# Patient Record
Sex: FEMALE | Race: WHITE | Employment: UNEMPLOYED | URBAN - METROPOLITAN AREA
[De-identification: names, ages, dates, MRNs, and addresses within clinical notes are randomized per-mention and may not be internally consistent; named-entity substitution may affect disease eponyms.]

---

## 2017-02-03 PROBLEM — K56.609 SMALL BOWEL OBSTRUCTION (HCC): Status: ACTIVE | Noted: 2017-02-03

## 2018-07-23 ENCOUNTER — OFFICE VISIT (OUTPATIENT)
Dept: CARDIOLOGY CLINIC | Age: 83
End: 2018-07-23
Payer: MEDICARE

## 2018-07-23 VITALS
HEIGHT: 61 IN | BODY MASS INDEX: 24.11 KG/M2 | DIASTOLIC BLOOD PRESSURE: 68 MMHG | WEIGHT: 127.7 LBS | HEART RATE: 69 BPM | SYSTOLIC BLOOD PRESSURE: 108 MMHG | RESPIRATION RATE: 18 BRPM

## 2018-07-23 DIAGNOSIS — I10 ESSENTIAL HYPERTENSION, BENIGN: Primary | Chronic | ICD-10-CM

## 2018-07-23 PROCEDURE — 99213 OFFICE O/P EST LOW 20 MIN: CPT | Performed by: INTERNAL MEDICINE

## 2018-07-23 PROCEDURE — 93000 ELECTROCARDIOGRAM COMPLETE: CPT | Performed by: INTERNAL MEDICINE

## 2018-07-23 NOTE — PROGRESS NOTES
[facility]  Wing NEO Galvez. Nirali Ramos. Rona Baker M.D. Trino Watts M.D.  Ev Kay. Sandie Hairston M.D. Halle Crump M.D. Oc Catherine, Ozzy Horn M.D.                Ruma Flaviolion   4/25/1929  Miguel Hinkle MD      This 70-year-old woman had outpatient cardiac assessment today. She is accompanied by her . She has a history of chronic renal insufficiency and anemia. Historically, she is followed with the hematologist. She has chronic ischemic heart disease. She is sedentary, but continues to live independently in her own home with her . She states that she'll become dyspneic with some activities, but ordinarily can shop and go without work without any chest pain. She denies orthopnea or PND    Medical History:  1. Anemia, treated with Ferrlecit and Aranesp. 2. Chronic kidney disease, Stage III.  BUN/Cr 33/2.2, GFR 21 - 05/2015.    3. Hypertension. 4. GI bleed. 5. Hypothyroidism. 6. Thyroid abnormality. 7. Arthritis. 8. GERD. 9. Atrial flutter. 10. Hyperlipidemia. 11. Hysterectomy, hernia repair, appendectomy, eye surgery (cataracts). 12. Colon cancer.  T2, N0.  Status post right hemicolectomy - 2015.    13. Allergies:  Iodine and sulfa.    14. Chronic/active cigarette abuse, one-half pack daily - 10/2015.    15. Myocardial infarction, 08/2015 - [facility] admission.  Troponin elevated. 16. Lexiscan MPS. EF 65%. Wall motion normal. Fixed defect lateral wall. \"Limited region of possible shawna-infarct ischemia. \"    17. Echo 08/24/2015. No chamber dilatation.  No hemodynamically significant valvular abnormality. RVSP 35.8. EF 72%.    18. Hernia repair, [facility] - 05/2016.      Review of Systems:  Constitutional: negative for fever and chills  Respiratory: negative for cough and hemoptysis  Cardiovascular:   Gastrointestinal: negative for abdominal pain, diarrhea, nausea and vomiting  Genitourinary:negative for dysuria and hematuria  Derm: negative for rash and skin lesion(s)  Neurological: negative for seizures and tremors  Endocrine: negative for diabetic symptoms including polydipsia and polyuria  Musculoskeletal: negative for CTD  Psychiatric: negative for psychosis and major depression    On examination, she is alert, pleasant, appropriate, elderly woman in no distress. Skin is warm and dry. Respirations are unlabored. /68   Pulse 69   Resp 18   Ht 5' 1\" (1.549 m)   Wt 127 lb 11.2 oz (57.9 kg)   BMI 24.13 kg/m² . HEENT negative for scleral icterus. Extraocular muscles intact. No facial asymmetry or central cyanosis. Neck without masses or goiter. No bruit or JVD. Cardiac apex not displaced. Rhythm regular. Abdomen normal.  Extremities without edema. Lungs are clear. EKG today shows sinus rhythm at 69/m. RBBB. Low voltage. Clinically Mrs. Karina Flores is stable from a cardiac perspective and no changes are made in her medications. She will be scheduled for follow-up. She is asked to continue to see Dr. Quyen Theodore on a regular basis. At completion of today's visit, medications include the following:    Current Outpatient Prescriptions:     metoprolol tartrate (LOPRESSOR) 25 MG tablet, Take 0.5 tablets by mouth 2 times daily, Disp: 60 tablet, Rfl: 3    Cholecalciferol (VITAMIN D) 2000 UNITS CAPS capsule, Take 2,000 Units by mouth nightly , Disp: , Rfl:     aspirin 81 MG tablet, Take 81 mg by mouth daily, Disp: , Rfl:     calcitRIOL (ROCALTROL) 0.25 MCG capsule, Take 0.25 mcg by mouth daily , Disp: , Rfl:     loperamide (IMODIUM) 2 MG capsule, Take 2 mg by mouth as needed , Disp: , Rfl:     famotidine (PEPCID) 20 MG tablet, Take 20 mg by mouth daily, Disp: , Rfl:     ferrous sulfate 325 (65 FE) MG tablet, Take 325 mg by mouth nightly , Disp: , Rfl:     atorvastatin (LIPITOR) 40 MG tablet, Take 40 mg by mouth nightly , Disp: , Rfl:     levothyroxine (SYNTHROID) 100 MCG tablet, Take 100 mcg by mouth daily.  Instructed to take with sip water am of procedure , Disp: , Rfl:     traMADol (ULTRAM) 50 MG tablet, Take 50 mg by mouth nightly.  , Disp: , Rfl:       Bakari Schmitt MD

## 2018-07-23 NOTE — PROGRESS NOTES
[facility]  Leobardo Laurent M.D. Nolan Jimenes. Keisha Allen M.D. Marimar Phan M.D.  Halle Oliveira. Lynn Saenz M.D. Eliu Franks M.D. Denia Nunez M.D.                Tika Shall   4/25/1929  Faraz Brooks MD      ***    Medical History:  edical History:  1. Anemia, treated with Ferrlecit and Aranesp. 2. Chronic kidney disease, Stage III.  BUN/Cr 33/2.2, GFR 21 - 05/2015. 3. Hypertension. 4. GI bleed. 5. Hypothyroidism. 6. Thyroid abnormality. 7. Arthritis. 8. GERD. 9. Atrial flutter. 10. Hyperlipidemia. 11. Hysterectomy, hernia repair, appendectomy, eye surgery (cataracts). 12. Colon cancer. T2, N0. Status post right hemicolectomy - 2015. 13. Allergies:  Iodine and sulfa. 14. Chronic/active cigarette abuse, one-half pack daily - 10/2015. 15. Myocardial infarction, 08/2015 - [facility] admission. Troponin elevated. 16. Lexiscan MPS. EF 65%. Wall motion normal. Fixed defect lateral wall. \"Limited region of possible shawna-infarct ischemia. \"    17. Echo 08/24/2015. No chamber dilatation. No hemodynamically significant valvular abnormality. RVSP 35.8. EF 72%. 18. Hernia repair, [facility] - 05/2016. Review of Systems:  Constitutional: negative for fever and chills  Respiratory: negative for cough and hemoptysis  Cardiovascular:   Gastrointestinal: negative for abdominal pain, diarrhea, nausea and vomiting  Genitourinary:negative for dysuria and hematuria  Derm: negative for rash and skin lesion(s)  Neurological: negative for seizures and tremors  Endocrine: negative for diabetic symptoms including polydipsia and polyuria  Musculoskeletal: negative for CTD  Psychiatric: negative for psychosis and major depression    On examination, ***. Skin is warm and dry. Respirations are unlabored. /68   Pulse 69   Resp 18   Ht 5' 1\" (1.549 m)   Wt 127 lb 11.2 oz (57.9 kg)   BMI 24.13 kg/m² . HEENT negative for scleral icterus.   Extraocular

## 2019-05-29 NOTE — PROGRESS NOTES
CTD  Psychiatric: negative for psychosis and major depression    On examination, she is an alert, pleasant, elderly female. Skin is warm and dry. Respirations are unlabored. /68   Pulse 63   Resp 12   Ht 5' 1\" (1.549 m)   Wt 131 lb (59.4 kg)   BMI 24.75 kg/m² . HEENT negative for scleral icterus. Extraocular muscles intact. No facial asymmetry or central cyanosis. Neck without masses or goiter. No bruit or JVD. Cardiac apex not displaced. Rhythm regular. Abdomen normal.  Extremities without edema. EKG today shows normal sinus rhythm and probable old inferior MI. Today it is noted that her blood pressure is somewhat low. She does not seemingly have any adverse effect from it. However, if this is the range in follow-up with Dr. Leeann Olsen, then decreasing the metoprolol dose would be appropriate. Today, no cardiac testing is recommended. She'll be seen back in follow-up in about one year. At completion of today's visit, medications include the following:    Current Outpatient Medications:     metoprolol tartrate (LOPRESSOR) 25 MG tablet, Take 0.5 tablets by mouth 2 times daily, Disp: 60 tablet, Rfl: 3    Cholecalciferol (VITAMIN D) 2000 UNITS CAPS capsule, Take 2,000 Units by mouth nightly , Disp: , Rfl:     aspirin 81 MG tablet, Take 81 mg by mouth daily, Disp: , Rfl:     calcitRIOL (ROCALTROL) 0.25 MCG capsule, Take 0.25 mcg by mouth daily , Disp: , Rfl:     loperamide (IMODIUM) 2 MG capsule, Take 2 mg by mouth as needed , Disp: , Rfl:     famotidine (PEPCID) 20 MG tablet, Take 20 mg by mouth daily, Disp: , Rfl:     ferrous sulfate 325 (65 FE) MG tablet, Take 325 mg by mouth nightly , Disp: , Rfl:     atorvastatin (LIPITOR) 40 MG tablet, Take 40 mg by mouth nightly , Disp: , Rfl:     levothyroxine (SYNTHROID) 100 MCG tablet, Take 100 mcg by mouth daily.  Instructed to take with sip water am of procedure , Disp: , Rfl:     traMADol (ULTRAM) 50 MG tablet, Take 50 mg by mouth nightly.  , Disp: , Rfl:       Sumit Tello MD

## 2019-05-31 ENCOUNTER — OFFICE VISIT (OUTPATIENT)
Dept: CARDIOLOGY CLINIC | Age: 84
End: 2019-05-31
Payer: MEDICARE

## 2019-05-31 VITALS
WEIGHT: 131 LBS | SYSTOLIC BLOOD PRESSURE: 102 MMHG | BODY MASS INDEX: 24.73 KG/M2 | HEART RATE: 63 BPM | HEIGHT: 61 IN | DIASTOLIC BLOOD PRESSURE: 68 MMHG | RESPIRATION RATE: 12 BRPM

## 2019-05-31 DIAGNOSIS — I10 HYPERTENSION, UNSPECIFIED TYPE: Primary | ICD-10-CM

## 2019-05-31 PROCEDURE — 93000 ELECTROCARDIOGRAM COMPLETE: CPT | Performed by: INTERNAL MEDICINE

## 2019-05-31 PROCEDURE — 99024 POSTOP FOLLOW-UP VISIT: CPT | Performed by: INTERNAL MEDICINE

## 2021-04-22 ENCOUNTER — OFFICE VISIT (OUTPATIENT)
Dept: CARDIOLOGY CLINIC | Age: 86
End: 2021-04-22
Payer: MEDICARE

## 2021-04-22 VITALS
HEIGHT: 62 IN | HEART RATE: 78 BPM | BODY MASS INDEX: 22.28 KG/M2 | DIASTOLIC BLOOD PRESSURE: 70 MMHG | RESPIRATION RATE: 18 BRPM | SYSTOLIC BLOOD PRESSURE: 110 MMHG | WEIGHT: 121.1 LBS

## 2021-04-22 DIAGNOSIS — I10 ESSENTIAL HYPERTENSION, BENIGN: Primary | Chronic | ICD-10-CM

## 2021-04-22 PROCEDURE — 93000 ELECTROCARDIOGRAM COMPLETE: CPT | Performed by: INTERNAL MEDICINE

## 2021-04-22 PROCEDURE — 99212 OFFICE O/P EST SF 10 MIN: CPT | Performed by: INTERNAL MEDICINE

## 2021-04-22 RX ORDER — DICYCLOMINE HYDROCHLORIDE 10 MG/1
10 CAPSULE ORAL
COMMUNITY

## 2021-04-22 RX ORDER — MEMANTINE HYDROCHLORIDE 5 MG/1
5 TABLET ORAL 2 TIMES DAILY
COMMUNITY

## 2021-04-22 RX ORDER — DONEPEZIL HYDROCHLORIDE 10 MG/1
10 TABLET, FILM COATED ORAL NIGHTLY
COMMUNITY

## 2021-04-22 NOTE — PROGRESS NOTES
[facility]  Yaz Jaramillo. Mateo Collins M.D. Alvarado Parr M.D.  Shira Dias. Ronal Hinson M.D. Kandi Farr M.D. Ophelia Huitron M.D.                [facility]   4/25/1929  Edis Negrete MD      This 78-year-old woman had outpatient cardiac follow-up today. She continues to live independently with her . According to him she has developed some mild dementia. Today she states she is not having any chest pain or dyspnea but tires easily. She states that she eats and sleeps well. She has a history of anemia, chronic renal insufficiency and ischemic heart disease. She has chronic mild exertional dyspnea. Today, she states she cares for her house and does usual activities and is not severely limited. She denies chest pain. Medical History:  1. Anemia, treated with Ferrlecit and Aranesp. 2. Chronic kidney disease, Stage III.  BUN/Cr 33/2.2, GFR 21 - 05/2015.    3. Hypertension. 4. GI bleed. 5. Hypothyroidism. 6. Thyroid abnormality. 7. Arthritis. 8. GERD. 9. Atrial flutter. 10. Hyperlipidemia. 11. Hysterectomy, hernia repair, appendectomy, eye surgery (cataracts). 12. Colon cancer.  T2, N0.  Status post right hemicolectomy - 2015.    13. Allergies:  Iodine and sulfa.    14. Chronic/active cigarette abuse, one-half pack daily - 10/2015.    15. Myocardial infarction, 08/2015 - [facility] admission.  Troponin elevated. 16. Lexiscan MPS. EF 65%. Wall motion normal. Fixed defect lateral wall. \"Limited region of possible shawna-infarct ischemia. \"    17. Echo 08/24/2015. No chamber dilatation.  No hemodynamically significant valvular abnormality. RVSP 35.8. EF 72%.    18. Hernia repair, [facility] - 05/2016.      Review of Systems:  Constitutional: negative for fever and chills  Respiratory: negative for cough and hemoptysis  Cardiovascular:   Gastrointestinal: negative for abdominal pain, diarrhea, nausea and vomiting  Genitourinary:negative for dysuria and hematuria  Derm: negative for rash and skin lesion(s)  Neurological: negative for seizures and tremors  Endocrine: negative for diabetic symptoms including polydipsia and polyuria  Musculoskeletal: negative for CTD  Psychiatric: negative for psychosis and major depression    On examination, she is an alert, pleasant, elderly female. Skin is warm and dry. Respirations are unlabored. /70   Pulse 78   Resp 18   Ht 5' 2\" (1.575 m)   Wt 121 lb 1.6 oz (54.9 kg)   BMI 22.15 kg/m² . HEENT negative for scleral icterus. Extraocular muscles intact. No facial asymmetry or central cyanosis. Neck without masses or goiter. No bruit or JVD. Cardiac apex not displaced. Rhythm regular. Abdomen normal.  Extremities without edema. EKG today per Dr. Monica Rubio review: Normal sinus rhythm 78/min. Low QRS voltage. Otherwise normal    She seems symptomatically stable from a cardiovascular status. Medications are reviewed today and no changes made. She is asked to continue close follow-up with Dr. Ashley Mittal and will have cardiac follow-up in 6 months    At completion of today's visit, medications include the following:    Current Outpatient Medications:     donepezil (ARICEPT) 10 MG tablet, Take 10 mg by mouth nightly, Disp: , Rfl:     dicyclomine (BENTYL) 10 MG capsule, Take 10 mg by mouth 4 times daily (before meals and nightly), Disp: , Rfl:     memantine (NAMENDA) 5 MG tablet, Take 5 mg by mouth 2 times daily, Disp: , Rfl:     metoprolol tartrate (LOPRESSOR) 25 MG tablet, Take 0.5 tablets by mouth 2 times daily, Disp: 60 tablet, Rfl: 3    atorvastatin (LIPITOR) 40 MG tablet, Take 40 mg by mouth nightly , Disp: , Rfl:     levothyroxine (SYNTHROID) 100 MCG tablet, Take 100 mcg by mouth daily. Instructed to take with sip water am of procedure , Disp: , Rfl:       Sumit Rubio MD

## 2022-02-07 ENCOUNTER — OFFICE VISIT (OUTPATIENT)
Dept: CARDIOLOGY CLINIC | Age: 87
End: 2022-02-07
Payer: MEDICARE

## 2022-02-07 VITALS
RESPIRATION RATE: 16 BRPM | OXYGEN SATURATION: 97 % | DIASTOLIC BLOOD PRESSURE: 60 MMHG | WEIGHT: 132.6 LBS | SYSTOLIC BLOOD PRESSURE: 118 MMHG | HEIGHT: 62 IN | BODY MASS INDEX: 24.4 KG/M2 | HEART RATE: 79 BPM

## 2022-02-07 DIAGNOSIS — E03.9 HYPOTHYROIDISM, UNSPECIFIED TYPE: Chronic | ICD-10-CM
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
## 2022-02-07 DIAGNOSIS — D50.8 OTHER IRON DEFICIENCY ANEMIA: Chronic | ICD-10-CM

## 2022-02-07 DIAGNOSIS — I10 ESSENTIAL HYPERTENSION, BENIGN: ICD-10-CM

## 2022-02-07 DIAGNOSIS — R94.39 ABNORMAL STRESS TEST: ICD-10-CM

## 2022-02-07 DIAGNOSIS — Z87.19 H/O: GI BLEED: ICD-10-CM

## 2022-02-07 DIAGNOSIS — I48.92 ATRIAL FLUTTER, UNSPECIFIED TYPE (HCC): Primary | ICD-10-CM

## 2022-02-07 PROCEDURE — 93000 ELECTROCARDIOGRAM COMPLETE: CPT | Performed by: INTERNAL MEDICINE

## 2022-02-07 PROCEDURE — 99214 OFFICE O/P EST MOD 30 MIN: CPT | Performed by: INTERNAL MEDICINE

## 2022-02-07 RX ORDER — LOPERAMIDE HYDROCHLORIDE 2 MG/1
2 CAPSULE ORAL 4 TIMES DAILY PRN
COMMUNITY

## 2022-02-07 NOTE — PROGRESS NOTES
Out Patient CARDIOLOGY FOLLOW UP    Name: Kasia Patient    Age: 80 y.o. Date of Service: 2/7/2022      Referring Physician: No admitting provider for patient encounter. Chief Complaint   Patient presents with    Hypertension     Pt has no complaints        History of Present Illness: 70-year-old female very difficult in hearing history of anemia, hypertension, prior GI bleed, hypothyroidism, atrial flutter, hyperlipidemia, history of colon cancer in 2015, history of tobacco abuse, who presented for follow-up visit. The  report patient's dementia is worsening and they will follow-up with your primary care physician. Otherwise no chest pain or dyspnea on exertion. In 2015 he had an ischemic evaluation that revealed small area of possible shawna-infarct ischemia and this was medically managed and now denies any symptoms. She reports she slipped and fell recently and following up with her primary care physician for evaluation and management. To follow-up in 6 months. Medical History:  1. Anemia, treated with Ferrlecit and Aranesp. 2. Chronic kidney disease, Stage III.  BUN/Cr 33/2.2, GFR 21 - 05/2015.    3. Hypertension. 4. GI bleed. 5. Hypothyroidism. 6. Thyroid abnormality. 7. Arthritis. 8. GERD. 9. Atrial flutter. 10. Hyperlipidemia. 11. Hysterectomy, hernia repair, appendectomy, eye surgery (cataracts). 12. Colon cancer.  T2, N0.  Status post right hemicolectomy - 2015.    13. Allergies:  Iodine and sulfa.    14. Chronic/active cigarette abuse, one-half pack daily - 10/2015.    15. Myocardial infarction, 08/2015 - [facility] admission.  Troponin elevated. 16. Lexiscan MPS. EF 65%. Wall motion normal. Fixed defect lateral wall. \"Limited region of possible shawna-infarct ischemia. \"    17. Echo 08/24/2015. No chamber dilatation.  No hemodynamically significant valvular abnormality. RVSP 35.8. EF 72%.    18. Hernia repair, [facility] - 05/2016.      Review of Systems:   Cardiac: As per HPI  General: Denies fever or chills  Pulmonary: As per HPI  HEENT: Denies runny nose  GI: No complaints  : No complaints  Endocrine: Denies night sweats  Musculoskeletal: No complaints  Skin: Dry skin  Neuro: No complaints  Psych: Denies depression    Past Medical History:  Past Medical History:   Diagnosis Date    Arthritis     back    Atrial flutter (HCC)     controled with medication    GERD (gastroesophageal reflux disease)     Hyperlipidemia     Nausea & vomiting     Thyroid disease        Past Surgical History:  Past Surgical History:   Procedure Laterality Date    APPENDECTOMY      COSMETIC SURGERY      ENDOSCOPY, COLON, DIAGNOSTIC      EYE SURGERY      bilateral cataract   [place]    at abdomen  x2    HERNIA REPAIR Right 4/2016    HYSTERECTOMY      OTHER SURGICAL HISTORY  7/11/2012    PPV, membranectomy, AGFX, ICG right eye       Family History:  Family History   Problem Relation Age of Onset    Cancer Mother        Social History:  Social History     Socioeconomic History    Marital status:      Spouse name: Not on file    Number of children: Not on file    Years of education: Not on file    Highest education level: Not on file   Occupational History    Not on file   Tobacco Use    Smoking status: Current Every Day Smoker     Packs/day: 0.25     Years: 60.00     Pack years: 15.00     Start date: 2/3/1947    Smokeless tobacco: Never Used    Tobacco comment: 1 or 2 in the evening   Vaping Use    Vaping Use: Never used   Substance and Sexual Activity    Alcohol use: Yes     Comment: very seldom    Drug use: No    Sexual activity: Never   Other Topics Concern    Not on file   Social History Narrative    Not on file     Social Determinants of Health     Financial Resource Strain:     Difficulty of Paying Living Expenses: Not on file   Food Insecurity:     Worried About Running Out of Food in the Last Year: Not on file    Litzy of Food in the Last Year: Not on file levothyroxine (SYNTHROID) 75 MCG tablet Take 75 mcg by mouth daily Instructed to take with sip water am of procedure    Yes Historical Provider, MD   memantine (NAMENDA) 5 MG tablet Take 5 mg by mouth 2 times daily  Patient not taking: Reported on 2/7/2022    Historical Provider, MD       Current Medications:  Current Outpatient Medications   Medication Sig Dispense Refill    loperamide (IMODIUM) 2 MG capsule Take 2 mg by mouth 4 times daily as needed for Diarrhea      donepezil (ARICEPT) 10 MG tablet Take 10 mg by mouth nightly      dicyclomine (BENTYL) 10 MG capsule Take 10 mg by mouth 4 times daily (before meals and nightly)      metoprolol tartrate (LOPRESSOR) 25 MG tablet Take 0.5 tablets by mouth 2 times daily (Patient taking differently: Take 25 mg by mouth daily ) 60 tablet 3    atorvastatin (LIPITOR) 40 MG tablet Take 40 mg by mouth nightly       levothyroxine (SYNTHROID) 75 MCG tablet Take 75 mcg by mouth daily Instructed to take with sip water am of procedure       memantine (NAMENDA) 5 MG tablet Take 5 mg by mouth 2 times daily (Patient not taking: Reported on 2/7/2022)       No current facility-administered medications for this visit. Physical Exam:  /60   Pulse 79   Resp 16   Ht 5' 2\" (1.575 m)   Wt 132 lb 9.6 oz (60.1 kg)   SpO2 97%   BMI 24.25 kg/m²   Wt Readings from Last 3 Encounters:   02/07/22 132 lb 9.6 oz (60.1 kg)   04/22/21 121 lb 1.6 oz (54.9 kg)   05/31/19 131 lb (59.4 kg)       Appearance: Alert and oriented x3 not in acute distress.   Skin: Dry skin  Head: Atraumatic  Eyes: Intact extraocular muscles   ENMT: Mucous membranes are moist  Neck: Supple  Lungs: Clear to auscultation  Cardiac: Normal S1 and S2  Abdomen: Protuberant  Extremities: Intact range of motion  Neurologic: No focal neurological deficits  Peripheral Pulses: 2+ peripheral pulses    Intake/Output:  No intake or output data in the 24 hours ending 02/07/22 1739  [unfilled]    Laboratory Tests:  No results for input(s): NA, K, CL, CO2, BUN, CREATININE, GLUCOSE, CALCIUM in the last 72 hours. Lab Results   Component Value Date    MG 1.8 01/31/2015     No results for input(s): ALKPHOS, ALT, AST, PROT, BILITOT, BILIDIR, LABALBU in the last 72 hours. No results for input(s): WBC, RBC, HGB, HCT, MCV, MCH, MCHC, RDW, PLT, MPV in the last 72 hours. No results found for: CKTOTAL, CKMB, CKMBINDEX, TROPONINI  No results for input(s): CKTOTAL, CKMB, CKMBINDEX, TROPHS in the last 72 hours. No results found for: INR, PROTIME  No results found for: TSHFT4, TSH  No results found for: LABA1C  No results found for: EAG  No results found for: CHOL  No results found for: TRIG  No results found for: HDL  No results found for: LDLCALC, LDLCHOLESTEROL  No results found for: LABVLDL, VLDL  No results found for: CHOLHDLRATIO  No results for input(s): PROBNP in the last 72 hours. Cardiac Tests:  EKG reviewed (EKG date: Sinus rhythm 79 bpm nonspecific ST-T wave changes):        Echocardiogram reviewed:     Stress test reviewed:      Cardiac catheterization reviewed:     CXR reviewed: The ASCVD Risk score (Ritesh Campa, et al., 2013) failed to calculate for the following reasons: The 2013 ASCVD risk score is only valid for ages 36 to 78    ASSESSMENT / PLAN:    1. Atrial flutter, unspecified type [facility])  It is reported patient has history of atrial flutter  She is on beta blocker but not on anticoagulation likely due to recurrent falls and prior GI bleed  Currently denies any symptoms    2. H/O: GI bleed  Denies recent bleed    3. Abnormal stress test  She had abnormal stress test years ago and was medically managed and has been stable  4. Essential hypertension, benign  Blood pressure is stable    5. Other iron deficiency anemia  Follow-up with your PCP  6. Hypothyroidism, unspecified type  Follow-up with your PCP    7.   Fall  Follow-up with your PCP for PT OT          FOLLOW-UP in 6 months        Thank you for allowing me to participate in your patient's care. Please feel free to contact me if you have any questions or concerns.     Ilia Sunshine MD  [facility]) Cardiology

## 2023-07-30 LAB
CULTURE: ABNORMAL
DIRECT EXAM: ABNORMAL
SPECIMEN DESCRIPTION: ABNORMAL

## 2025-01-07 ENCOUNTER — OFFICE VISIT (OUTPATIENT)
Dept: FAMILY MEDICINE CLINIC | Age: 89
End: 2025-01-07
Payer: MEDICARE

## 2025-01-07 VITALS
HEIGHT: 62 IN | RESPIRATION RATE: 16 BRPM | TEMPERATURE: 97.9 F | WEIGHT: 120 LBS | BODY MASS INDEX: 22.08 KG/M2 | OXYGEN SATURATION: 98 % | HEART RATE: 70 BPM | DIASTOLIC BLOOD PRESSURE: 62 MMHG | SYSTOLIC BLOOD PRESSURE: 118 MMHG

## 2025-01-07 DIAGNOSIS — M25.552 LEFT HIP PAIN: Primary | ICD-10-CM

## 2025-01-07 PROCEDURE — 1160F RVW MEDS BY RX/DR IN RCRD: CPT | Performed by: PHYSICIAN ASSISTANT

## 2025-01-07 PROCEDURE — 96372 THER/PROPH/DIAG INJ SC/IM: CPT | Performed by: PHYSICIAN ASSISTANT

## 2025-01-07 PROCEDURE — 99204 OFFICE O/P NEW MOD 45 MIN: CPT | Performed by: PHYSICIAN ASSISTANT

## 2025-01-07 PROCEDURE — 1159F MED LIST DOCD IN RCRD: CPT | Performed by: PHYSICIAN ASSISTANT

## 2025-01-07 PROCEDURE — 1123F ACP DISCUSS/DSCN MKR DOCD: CPT | Performed by: PHYSICIAN ASSISTANT

## 2025-01-07 RX ORDER — METHYLPREDNISOLONE 4 MG/1
TABLET ORAL
Qty: 1 KIT | Refills: 0 | Status: SHIPPED | OUTPATIENT
Start: 2025-01-07

## 2025-01-07 RX ORDER — METHYLPREDNISOLONE ACETATE 40 MG/ML
40 INJECTION, SUSPENSION INTRA-ARTICULAR; INTRALESIONAL; INTRAMUSCULAR; SOFT TISSUE ONCE
Status: COMPLETED | OUTPATIENT
Start: 2025-01-07 | End: 2025-01-07

## 2025-01-07 RX ADMIN — METHYLPREDNISOLONE ACETATE 40 MG: 40 INJECTION, SUSPENSION INTRA-ARTICULAR; INTRALESIONAL; INTRAMUSCULAR; SOFT TISSUE at 11:28

## 2025-01-07 NOTE — PROGRESS NOTES
medical history reviewed.    Allergies reviewed.    Medications reviewed.    Patient on arrival does not appear to be in any apparent distress or discomfort.  The patient has been seen and evaluated.  The patient does not appear to be toxic or lethargic.     X-rays obtained of the left hip and pelvis    X-rays read by the radiologist did not show any evidence of acute fracture there is some mild osteoarthritis.    We discussed differential diagnosis and I recommended having a CT scan of the lumbar spine and pelvis to rule out occult fracture they declined at this time    The patient will be treated with IM injection of methylprednisone the patient will be given a Medrol Dosepak    The patient is to return to express care or go directly to the emergency department should any of the signs or symptoms worsen. The patient is to followup with primary care physician in 2-3 days for repeat evaluation. The patient has no other questions or concerns at this time the patient will be discharged home.      Clinical Impression:   Jono was seen today for hip pain.    Diagnoses and all orders for this visit:    Left hip pain  -     XR HIP 2-3 VW W PELVIS LEFT; Future  -     methylPREDNISolone acetate (DEPO-MEDROL) injection 40 mg    Other orders  -     methylPREDNISolone (MEDROL DOSEPACK) 4 MG tablet; Take by mouth.        The patient is to call for any concerns or return if any of the signs or symptoms worsen. The patient is to follow-up with PCP in the next 2-3 days for repeat evaluation repeat assessment or go directly to the emergency department.     SIGNATURE: Teto Alvarez III, PA-C    This document may have been prepared at least partially through the use of voice recognition software. Although effort is taken to assure the accuracy ofthis document, it is possible that grammatical, syntax, or spelling errors may occur.     **This report was transcribed using voice recognition software. The patient (or guardian, if